# Patient Record
Sex: MALE | Employment: FULL TIME | URBAN - METROPOLITAN AREA
[De-identification: names, ages, dates, MRNs, and addresses within clinical notes are randomized per-mention and may not be internally consistent; named-entity substitution may affect disease eponyms.]

---

## 2018-05-15 ENCOUNTER — OFFICE VISIT (OUTPATIENT)
Dept: INTERNAL MEDICINE CLINIC | Facility: CLINIC | Age: 23
End: 2018-05-15

## 2018-05-15 VITALS
HEIGHT: 69 IN | OXYGEN SATURATION: 98 % | SYSTOLIC BLOOD PRESSURE: 106 MMHG | TEMPERATURE: 99 F | WEIGHT: 198 LBS | DIASTOLIC BLOOD PRESSURE: 76 MMHG | BODY MASS INDEX: 29.33 KG/M2 | HEART RATE: 64 BPM | RESPIRATION RATE: 12 BRPM

## 2018-05-15 DIAGNOSIS — Z00.00 ROUTINE GENERAL MEDICAL EXAMINATION AT A HEALTH CARE FACILITY: Primary | ICD-10-CM

## 2018-05-15 PROBLEM — J45.20 MILD INTERMITTENT ASTHMA WITHOUT COMPLICATION: Status: ACTIVE | Noted: 2018-05-15

## 2018-05-15 PROBLEM — J45.20 MILD INTERMITTENT ASTHMA WITHOUT COMPLICATION (HCC): Status: ACTIVE | Noted: 2018-05-15

## 2018-05-15 PROCEDURE — 99385 PREV VISIT NEW AGE 18-39: CPT | Performed by: NURSE PRACTITIONER

## 2018-05-15 RX ORDER — ALBUTEROL SULFATE 90 UG/1
2 AEROSOL, METERED RESPIRATORY (INHALATION) EVERY 6 HOURS PRN
COMMUNITY
End: 2018-08-14

## 2018-05-15 NOTE — PROGRESS NOTES
Dimitry Singh is a 20year old male who presents for a complete physical exam.   HPI:   Pt complains of chest enlargement.  He is unsure if his chest is larger than a typical male chest. It has been this way as long as he can remember and would like it breath with exertion  CARDIOVASCULAR: denies chest pain on exertion  GI: denies abdominal pain except after eating spicy foods,denies heartburn  : denies nocturia or changes in stream  MUSCULOSKELETAL: denies back pain  NEURO: occasional headaches when h sleep. Health maintenance, discussed no need for labs until age 21 with no major concerns or family history. Discussed testicular self exam with patient who verbalizes understanding.   The patient indicates understanding of these issues and agrees to the pl

## 2018-08-14 ENCOUNTER — OFFICE VISIT (OUTPATIENT)
Dept: INTERNAL MEDICINE CLINIC | Facility: CLINIC | Age: 23
End: 2018-08-14
Payer: COMMERCIAL

## 2018-08-14 VITALS
OXYGEN SATURATION: 96 % | SYSTOLIC BLOOD PRESSURE: 102 MMHG | HEIGHT: 69.5 IN | WEIGHT: 203.5 LBS | DIASTOLIC BLOOD PRESSURE: 66 MMHG | BODY MASS INDEX: 29.46 KG/M2 | HEART RATE: 58 BPM | TEMPERATURE: 98 F | RESPIRATION RATE: 18 BRPM

## 2018-08-14 DIAGNOSIS — R51.9 GENERALIZED HEADACHES: ICD-10-CM

## 2018-08-14 DIAGNOSIS — S13.4XXA WHIPLASH INJURY TO NECK, INITIAL ENCOUNTER: ICD-10-CM

## 2018-08-14 DIAGNOSIS — M54.41 ACUTE RIGHT-SIDED LOW BACK PAIN WITH RIGHT-SIDED SCIATICA: ICD-10-CM

## 2018-08-14 DIAGNOSIS — M25.561 ACUTE PAIN OF RIGHT KNEE: ICD-10-CM

## 2018-08-14 DIAGNOSIS — R93.0 ABNORMAL CT SCAN OF HEAD: Primary | ICD-10-CM

## 2018-08-14 PROCEDURE — 99214 OFFICE O/P EST MOD 30 MIN: CPT | Performed by: INTERNAL MEDICINE
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
## 2018-08-14 NOTE — PATIENT INSTRUCTIONS
- Start anti-inflammatory (like ibuprofen)  - Follow up with physical therapy Tata Lazaro or MARTINEI)  - Follow up with Neurology (Dr. Elena Santiago). She is here on Thursdays. In [place] rest of the week.    - Depending on how physical therapy

## 2020-11-07 NOTE — PATIENT INSTRUCTIONS
Treating Anxiety Disorders with Therapy    If you have an anxiety disorder, you don’t have to suffer needlessly. Treatment is available. Therapy (also called counseling) is often a helpful treatment for anxiety disorders.  With therapy, a trained morro treatment only works if you learn to face the causes of your anxiety. So, you might feel worse before you feel better. This can sometimes make it hard to stick with it. But remember: Therapy is a very effective treatment. The results will be well worth it. natural defense system. It's an alert to a threat that is unknown, vague, or comes from your own internal fears. While you’re in this state, your feelings can range from a vague sense of worry to physical sensations such as a pounding heartbeat.  These feel anxiety that’s disrupting your life can be treated. Check with your healthcare provider and rule out any physical problems that may be causing the anxiety symptoms. If an anxiety disorder is diagnosed, seek mental healthcare.  This is an illness and it can to stress and normally causes only a mild reaction. When anxiety becomes more severe, it can interfere with daily life. In some cases, you may not even be aware of what you’re anxious about. There may also be a genetic link.  Or it may be a learned behavior techniques that are helpful:  ? Relaxation  ? Breathing exercises  ? Visualization  ? Biofeedback  ? Meditation  For more information about this, talk with your healthcare provider. Or check online or at your [employer] or bookstore.  You'll find many jimbo

## 2020-11-07 NOTE — PROGRESS NOTES
Patient Office Visit    ASSESSMENT AND PLAN:   (F41.9) Anxiety  (primary encounter diagnosis)  Plan: [facility] - INTERNAL  Note: will request records from the ER. Reassurance provided and discussed about anxiety coping techniques.  Referral Career and having 3 7 month old   - he has not been going to the gym as well   - he drinks some pre work out drinks and caffeine,     Which he has since stopped   - he was taking CBD as well and stopped it about      A week ago prior to the symptoms   - h bleeding   Psychiatric/Behavioral: normal mood no anxiety normal behavior     /80 (BP Location: Left arm, Patient Position: Sitting, Cuff Size: adult)   Pulse 86   Temp 97.6 °F (36.4 °C) (Oral)   Resp 16   Ht 69.5\"   Wt 206 lb 6.4 oz (93.6 kg)   SpO

## 2020-11-09 ENCOUNTER — TELEPHONE (OUTPATIENT)
Dept: INTERNAL MEDICINE CLINIC | Facility: CLINIC | Age: 25
End: 2020-11-09

## 2021-04-12 ENCOUNTER — APPOINTMENT (OUTPATIENT)
Dept: GENERAL RADIOLOGY | Age: 26
End: 2021-04-12
Attending: EMERGENCY MEDICINE

## 2021-04-12 ENCOUNTER — HOSPITAL ENCOUNTER (EMERGENCY)
Age: 26
Discharge: LEFT BEFORE DISCHARGE | End: 2021-04-12

## 2021-04-12 VITALS
OXYGEN SATURATION: 99 % | TEMPERATURE: 97.5 F | RESPIRATION RATE: 20 BRPM | HEART RATE: 66 BPM | DIASTOLIC BLOOD PRESSURE: 98 MMHG | SYSTOLIC BLOOD PRESSURE: 158 MMHG | WEIGHT: 215.39 LBS

## 2021-04-12 LAB
ALBUMIN SERPL-MCNC: 4.5 G/DL (ref 3.6–5.1)
ALBUMIN/GLOB SERPL: 1.5 {RATIO} (ref 1–2.4)
ALP SERPL-CCNC: 84 UNITS/L (ref 45–117)
ALT SERPL-CCNC: 86 UNITS/L
ANION GAP SERPL CALC-SCNC: 10 MMOL/L (ref 10–20)
AST SERPL-CCNC: 27 UNITS/L
BASOPHILS # BLD: 0.1 K/MCL (ref 0–0.3)
BASOPHILS NFR BLD: 1 %
BILIRUB SERPL-MCNC: 0.4 MG/DL (ref 0.2–1)
BUN SERPL-MCNC: 14 MG/DL (ref 6–20)
BUN/CREAT SERPL: 15 (ref 7–25)
CALCIUM SERPL-MCNC: 9.4 MG/DL (ref 8.4–10.2)
CHLORIDE SERPL-SCNC: 105 MMOL/L (ref 98–107)
CO2 SERPL-SCNC: 26 MMOL/L (ref 21–32)
CREAT SERPL-MCNC: 0.96 MG/DL (ref 0.67–1.17)
DEPRECATED RDW RBC: 37.5 FL (ref 39–50)
EOSINOPHIL # BLD: 0.1 K/MCL (ref 0–0.5)
EOSINOPHIL NFR BLD: 1 %
ERYTHROCYTE [DISTWIDTH] IN BLOOD: 11.8 % (ref 11–15)
FASTING DURATION TIME PATIENT: ABNORMAL H
GFR SERPLBLD BASED ON 1.73 SQ M-ARVRAT: >90 ML/MIN/1.73M2
GLOBULIN SER-MCNC: 3.1 G/DL (ref 2–4)
GLUCOSE SERPL-MCNC: 106 MG/DL (ref 65–99)
HCT VFR BLD CALC: 47.1 % (ref 39–51)
HGB BLD-MCNC: 16.3 G/DL (ref 13–17)
IMM GRANULOCYTES # BLD AUTO: 0 K/MCL (ref 0–0.2)
IMM GRANULOCYTES # BLD: 0 %
LYMPHOCYTES # BLD: 2.2 K/MCL (ref 1–4.8)
LYMPHOCYTES NFR BLD: 22 %
MCH RBC QN AUTO: 30.3 PG (ref 26–34)
MCHC RBC AUTO-ENTMCNC: 34.6 G/DL (ref 32–36.5)
MCV RBC AUTO: 87.5 FL (ref 78–100)
MONOCYTES # BLD: 0.8 K/MCL (ref 0.3–0.9)
MONOCYTES NFR BLD: 8 %
NEUTROPHILS # BLD: 6.8 K/MCL (ref 1.8–7.7)
NEUTROPHILS NFR BLD: 68 %
NRBC BLD MANUAL-RTO: 0 /100 WBC
PLATELET # BLD AUTO: 283 K/MCL (ref 140–450)
POTASSIUM SERPL-SCNC: 3.8 MMOL/L (ref 3.4–5.1)
PROT SERPL-MCNC: 7.6 G/DL (ref 6.4–8.2)
RBC # BLD: 5.38 MIL/MCL (ref 4.5–5.9)
SODIUM SERPL-SCNC: 137 MMOL/L (ref 135–145)
TROPONIN I SERPL HS-MCNC: <0.02 NG/ML
WBC # BLD: 9.9 K/MCL (ref 4.2–11)

## 2021-04-12 PROCEDURE — 93005 ELECTROCARDIOGRAM TRACING: CPT | Performed by: EMERGENCY MEDICINE

## 2021-04-12 PROCEDURE — 80053 COMPREHEN METABOLIC PANEL: CPT | Performed by: EMERGENCY MEDICINE

## 2021-04-12 PROCEDURE — 84484 ASSAY OF TROPONIN QUANT: CPT | Performed by: EMERGENCY MEDICINE

## 2021-04-12 PROCEDURE — 71046 X-RAY EXAM CHEST 2 VIEWS: CPT

## 2021-04-12 PROCEDURE — 85025 COMPLETE CBC W/AUTO DIFF WBC: CPT | Performed by: EMERGENCY MEDICINE

## 2021-04-12 PROCEDURE — 10003627 HB COUNTER ED NO SERVICE

## 2021-04-12 PROCEDURE — 36415 COLL VENOUS BLD VENIPUNCTURE: CPT

## 2021-04-12 ASSESSMENT — PAIN SCALES - GENERAL: PAINLEVEL_OUTOF10: 3

## 2021-04-13 LAB
ATRIAL RATE (BPM): 71
P AXIS (DEGREES): 55
PR-INTERVAL (MSEC): 134
QRS-INTERVAL (MSEC): 88
QT-INTERVAL (MSEC): 364
QTC: 395
R AXIS (DEGREES): 26
RAINBOW EXTRA TUBES HOLD SPECIMEN: NORMAL
REPORT TEXT: NORMAL
T AXIS (DEGREES): 33
VENTRICULAR RATE EKG/MIN (BPM): 71

## 2021-04-15 ENCOUNTER — TELEPHONE (OUTPATIENT)
Dept: INTERNAL MEDICINE CLINIC | Facility: CLINIC | Age: 26
End: 2021-04-15

## 2021-04-15 NOTE — TELEPHONE ENCOUNTER
Patient requesting to be seen by Dr. Barry Ramirez because his parents are seen by him. Ok to schedule?

## 2021-04-16 ENCOUNTER — OFFICE VISIT (OUTPATIENT)
Dept: INTERNAL MEDICINE CLINIC | Facility: CLINIC | Age: 26
End: 2021-04-16
Payer: COMMERCIAL

## 2021-04-16 VITALS
DIASTOLIC BLOOD PRESSURE: 88 MMHG | OXYGEN SATURATION: 99 % | TEMPERATURE: 99 F | HEART RATE: 76 BPM | RESPIRATION RATE: 16 BRPM | WEIGHT: 205.19 LBS | BODY MASS INDEX: 29.71 KG/M2 | HEIGHT: 69.75 IN | SYSTOLIC BLOOD PRESSURE: 124 MMHG

## 2021-04-16 DIAGNOSIS — F41.9 ANXIETY: ICD-10-CM

## 2021-04-16 DIAGNOSIS — R07.9 CHEST PAIN, UNSPECIFIED TYPE: Primary | ICD-10-CM

## 2021-04-16 PROCEDURE — 99214 OFFICE O/P EST MOD 30 MIN: CPT | Performed by: FAMILY MEDICINE

## 2021-04-16 PROCEDURE — 3074F SYST BP LT 130 MM HG: CPT | Performed by: FAMILY MEDICINE

## 2021-04-16 PROCEDURE — 3079F DIAST BP 80-89 MM HG: CPT | Performed by: FAMILY MEDICINE

## 2021-04-16 PROCEDURE — 3008F BODY MASS INDEX DOCD: CPT | Performed by: FAMILY MEDICINE

## 2021-04-16 NOTE — PROGRESS NOTES
Laury Del Real is a 31year old male.   HPI:   New pt to me   A yr ago [facility] twins [facility]   Wife and his mother had a disagreement about that time    At the time had a panic attack w hyperventilate and increased HR   Was cked and DC

## 2021-04-19 ENCOUNTER — TELEPHONE (OUTPATIENT)
Dept: INTERNAL MEDICINE CLINIC | Facility: CLINIC | Age: 26
End: 2021-04-19

## 2021-04-19 NOTE — TELEPHONE ENCOUNTER
Romero Chávez, [address] ,[address]  [address]  [place] 939 [phone]     Spoke with patient stating he feels like he is over thinking does not think he needs to go to ER for this.  Patient was previously referred to [facility]

## 2021-04-19 NOTE — TELEPHONE ENCOUNTER
If he truly has these negative thoughts should go to the ER to be eval by [facility]

## 2021-04-19 NOTE — TELEPHONE ENCOUNTER
Spoke with patient stating he took Lexapro on Friday, Saturday, sunday as prescribed by psych and he is having Bad thoughts-worse depression, worse anxiety, unsure if self harm thoughts, back pain, extreme anger. Patient does not want to f/u with psych.  Pt

## 2021-04-19 NOTE — TELEPHONE ENCOUNTER
Patient called and stated his psychiatrist prescribed him Lexapro and he called Dr. Nia Arias to let him know he would be taking that instead of the sertraline.    Patient just called and stated the medication has caused him extreme anger, extremely bad thought

## 2021-05-29 ENCOUNTER — LAB ENCOUNTER (OUTPATIENT)
Dept: LAB | Age: 26
End: 2021-05-29
Attending: FAMILY MEDICINE
Payer: COMMERCIAL

## 2021-05-29 ENCOUNTER — OFFICE VISIT (OUTPATIENT)
Dept: INTERNAL MEDICINE CLINIC | Facility: CLINIC | Age: 26
End: 2021-05-29
Payer: COMMERCIAL

## 2021-05-29 VITALS
BODY MASS INDEX: 27.94 KG/M2 | WEIGHT: 193 LBS | RESPIRATION RATE: 16 BRPM | HEIGHT: 69.75 IN | OXYGEN SATURATION: 97 % | TEMPERATURE: 99 F | HEART RATE: 74 BPM | SYSTOLIC BLOOD PRESSURE: 120 MMHG | DIASTOLIC BLOOD PRESSURE: 80 MMHG

## 2021-05-29 DIAGNOSIS — Z00.00 ROUTINE GENERAL MEDICAL EXAMINATION AT A HEALTH CARE FACILITY: ICD-10-CM

## 2021-05-29 DIAGNOSIS — R00.2 HEART PALPITATIONS: ICD-10-CM

## 2021-05-29 DIAGNOSIS — F41.9 ANXIETY: ICD-10-CM

## 2021-05-29 DIAGNOSIS — R07.89 FEELING OF CHEST TIGHTNESS: ICD-10-CM

## 2021-05-29 DIAGNOSIS — Z00.00 ROUTINE GENERAL MEDICAL EXAMINATION AT A HEALTH CARE FACILITY: Primary | ICD-10-CM

## 2021-05-29 PROCEDURE — 3079F DIAST BP 80-89 MM HG: CPT | Performed by: FAMILY MEDICINE

## 2021-05-29 PROCEDURE — 36415 COLL VENOUS BLD VENIPUNCTURE: CPT

## 2021-05-29 PROCEDURE — 84443 ASSAY THYROID STIM HORMONE: CPT

## 2021-05-29 PROCEDURE — 85025 COMPLETE CBC W/AUTO DIFF WBC: CPT

## 2021-05-29 PROCEDURE — 80061 LIPID PANEL: CPT

## 2021-05-29 PROCEDURE — 84403 ASSAY OF TOTAL TESTOSTERONE: CPT

## 2021-05-29 PROCEDURE — 84402 ASSAY OF FREE TESTOSTERONE: CPT

## 2021-05-29 PROCEDURE — 99395 PREV VISIT EST AGE 18-39: CPT | Performed by: FAMILY MEDICINE

## 2021-05-29 PROCEDURE — 3074F SYST BP LT 130 MM HG: CPT | Performed by: FAMILY MEDICINE

## 2021-05-29 PROCEDURE — 3008F BODY MASS INDEX DOCD: CPT | Performed by: FAMILY MEDICINE

## 2021-05-29 PROCEDURE — 99213 OFFICE O/P EST LOW 20 MIN: CPT | Performed by: FAMILY MEDICINE

## 2021-05-29 PROCEDURE — 80053 COMPREHEN METABOLIC PANEL: CPT

## 2021-05-29 PROCEDURE — 83036 HEMOGLOBIN GLYCOSYLATED A1C: CPT

## 2021-05-29 PROCEDURE — 82306 VITAMIN D 25 HYDROXY: CPT

## 2021-05-29 NOTE — PROGRESS NOTES
Fortunato Khan is a 31year old male who presents for a complete physical exam.   HPI:   Pt complains of loosing weight. Patient's weight is 12 pounds down from his April visit Pt was 205 and is now 193. Current BMI is 27.88.   Pt states saw Dr. Ani Nieto 4 Refused                          11/07/2020    Wt Readings from Last 6 Encounters:  05/29/21 : 193 lb (87.5 kg)  04/16/21 : 205 lb 3.2 oz (93.1 kg)  11/07/20 : 206 lb 6.4 oz (93.6 kg)  08/14/18 : 203 lb 8 oz (92.3 kg)  05/15/18 : 198 lb (89.8 kg)  06/25/14 stream  MUSCULOSKELETAL: denies back pain  NEURO: denies headaches  PSYCHE: denies depression or anxiety  HEMATOLOGIC: denies hx of anemia  ENDOCRINE: denies thyroid history  ALL/ASTHMA: denies hx of allergy or asthma    EXAM:   /80 (BP Location: Rig (CPT=93663); Future  - CARD MONITOR HOLTER 24 HOUR (CPT=93225); Future  - TESTOSTERONE,TOTAL AND FREE MALE; Future    2. Feeling of chest tightness    - CARD ECHO 2D DOPPLER (CPT=93647); Future  - CARD MONITOR HOLTER 24 HOUR (CPT=93705); Future    3.  Donnelly Dec

## 2021-05-31 ENCOUNTER — PATIENT MESSAGE (OUTPATIENT)
Dept: INTERNAL MEDICINE CLINIC | Facility: CLINIC | Age: 26
End: 2021-05-31

## 2021-05-31 DIAGNOSIS — E55.9 VITAMIN D DEFICIENCY: Primary | ICD-10-CM

## 2021-06-01 NOTE — TELEPHONE ENCOUNTER
From: Apoorva Cevallos  To: ARCHANA Panchal  Sent: 5/31/2021 12:27 PM CDT  Subject: Non-Urgent Medical Question    Kailash Winslow,    Is there anything abnormal about my blood test? Anything that would pertain to feeling anxious and nervous?

## 2021-06-01 NOTE — TELEPHONE ENCOUNTER
Luisa Curtis, APRN   5/30/2021 10:29 AM CDT Back to Top      Patient has Vit D deficiency. Pt needs vit D 71069 IU once a week for 6 months, after 2000 IU daily!!!. Recheck vit D in 6 months. Hdl low, increasing exercise would help that.  Ldl and non hdl

## 2021-06-02 ENCOUNTER — TELEPHONE (OUTPATIENT)
Dept: INTERNAL MEDICINE CLINIC | Facility: CLINIC | Age: 26
End: 2021-06-02

## 2021-06-02 RX ORDER — ERGOCALCIFEROL 1.25 MG/1
50000 CAPSULE ORAL WEEKLY
Qty: 4 CAPSULE | Refills: 5 | Status: SHIPPED | OUTPATIENT
Start: 2021-06-02 | End: 2021-11-17

## 2021-06-02 NOTE — TELEPHONE ENCOUNTER
Pt filled out medical release for all immunizations. Faxed over and placed in [address] file on desk.

## 2021-06-07 NOTE — TELEPHONE ENCOUNTER
Vit D deficiency can lead to reduced production of testosterone. Low Vit D levels are associated with anxiety.  It can take 6-8 weeks for vit D levels to get back up to normal. Pt could try seeing a urologist thru [address]

## 2021-06-07 NOTE — TELEPHONE ENCOUNTER
Spoke with pt asking if his low testosterone level can be related to vitamin D deficiency as well as his anxiety. Please advise.

## 2021-06-07 NOTE — TELEPHONE ENCOUNTER
Asya Gutierrez, APRN   6/6/2021  8:21 PM CDT Back to Top      Free testosterone low. Pt needs to f/u with urology Dr. Dawit Patiño [phone].

## 2021-06-14 PROBLEM — J45.909 STEROID-DEPENDENT ASTHMA: Status: ACTIVE | Noted: 2021-06-14

## 2021-06-14 PROBLEM — J45.909: Status: ACTIVE | Noted: 2021-06-14

## 2021-06-27 DIAGNOSIS — E55.9 VITAMIN D DEFICIENCY: ICD-10-CM

## 2021-06-28 RX ORDER — ERGOCALCIFEROL 1.25 MG/1
50000 CAPSULE ORAL WEEKLY
Qty: 4 CAPSULE | Refills: 5 | OUTPATIENT
Start: 2021-06-28 | End: 2021-12-13

## 2021-06-28 NOTE — TELEPHONE ENCOUNTER
Ergocalciferol 50,000 Units  Filled 6-2-21  Qty 4  5 refills    Pt to call pharmacy for them to refill

## 2021-07-03 ENCOUNTER — LAB ENCOUNTER (OUTPATIENT)
Dept: LAB | Age: 26
End: 2021-07-03
Attending: FAMILY MEDICINE
Payer: COMMERCIAL

## 2021-07-03 DIAGNOSIS — E29.1 HYPOGONADISM IN MALE: ICD-10-CM

## 2021-07-03 DIAGNOSIS — E55.9 VITAMIN D DEFICIENCY: ICD-10-CM

## 2021-07-03 LAB
TESTOST SERPL-MCNC: 256.16 NG/DL
VIT D+METAB SERPL-MCNC: 66.7 NG/ML (ref 30–100)

## 2021-07-03 PROCEDURE — 82306 VITAMIN D 25 HYDROXY: CPT | Performed by: FAMILY MEDICINE

## 2021-07-27 NOTE — PROGRESS NOTES
Mr. Mcgregor Curet-      Your repeat T level is slightly low, but not dangerously so. I would not recommend testosterone supplementation at your age, as it often becomes a life long medication.       Jakob Barnes

## 2021-09-13 NOTE — PROGRESS NOTES
Dorene Mack is a 31year old male.   HPI:   Patient has agreed to do a video encounter w me today in lieu of a regular appointment in regards to his meds     (see previous vist w me ) he was started on lexapro by psych back then but had SE ren palpitat

## 2021-11-08 ENCOUNTER — PATIENT MESSAGE (OUTPATIENT)
Dept: INTERNAL MEDICINE CLINIC | Facility: CLINIC | Age: 26
End: 2021-11-08

## 2021-11-08 RX ORDER — SERTRALINE HYDROCHLORIDE 25 MG/1
25 TABLET, FILM COATED ORAL EVERY MORNING
COMMUNITY
Start: 2021-08-24

## 2021-11-08 NOTE — TELEPHONE ENCOUNTER
From: Otf Lazcano  To: Collin Mena MD  Sent: 11/8/2021 7:57 AM CST  Subject: Inhaler refills     Jenna Culver,    I have 2 inhalers I have used for quite a while that are both empty.  Can you please put in a refill for the Albuterol as well as th

## 2021-11-10 NOTE — TELEPHONE ENCOUNTER
Pt also requested Symbicort but I do not see it on med list (Current or discontinued)    Albuterol Sulfate 108 (90 Base) MCG/ACT Inhalation Aerosol Powder, Breath Activated                Sig: Inhale 2 puffs into the lungs every 6 (six) hours as needed.

## 2022-08-24 NOTE — TELEPHONE ENCOUNTER
[facility] sent to patient to schedule OV due to not being in office for over 1 year    Sent to TO

## 2022-08-29 RX ORDER — ALBUTEROL SULFATE 90 UG/1
AEROSOL, METERED RESPIRATORY (INHALATION)
Qty: 8.5 G | Refills: 0 | OUTPATIENT
Start: 2022-08-29

## 2022-08-29 NOTE — TELEPHONE ENCOUNTER
Duplicate request. [facility] sent to pt stating this was a courtesy refill and will need to be seen for further refills.      Albuterol 108  Asthma & COPD Medication Protocol Failed 08/28/2022 10:35 AM    Asthma Action Score greater than or equal to 20    Appointment in past 6 or next 3 months     AAP/ACT given in last 12 months   Filled 8/24/22  Qty 1   0 refills  LOV 5-29-21

## 2023-11-13 RX ORDER — ALBUTEROL SULFATE 90 UG/1
2 AEROSOL, METERED RESPIRATORY (INHALATION) EVERY 6 HOURS PRN
Qty: 1 EACH | Refills: 0 | Status: SHIPPED | OUTPATIENT
Start: 2023-11-13

## 2023-11-13 NOTE — TELEPHONE ENCOUNTER
Pt states tat Albuterol Pro-Air Respiclick is not covered under insurance and would like a different inhaler

## 2023-12-04 RX ORDER — ALBUTEROL SULFATE 90 UG/1
2 AEROSOL, METERED RESPIRATORY (INHALATION) EVERY 6 HOURS PRN
Qty: 8.5 EACH | Refills: 0 | OUTPATIENT
Start: 2023-12-04

## 2023-12-04 NOTE — TELEPHONE ENCOUNTER
Requesting   Name from pharmacy: ALBUTEROL HFA (Renee Menon) Mickey Ruiz          Will file in chart as: ALBUTEROL 108 (90 Base) MCG/ACT Inhalation Aero Soln    Sig: INHALE 2 PUFFS INTO THE LUNGS EVERY 6 HOURS AS NEEDED FOR WHEEZE    Disp: 8.5 each    Refills: 0 (Pharmacy requested: Not specified)    Start: 12/4/2023    Class: Normal    Non-formulary    Last ordered: 3 weeks ago (11/13/2023) by Bryce Jason MD    Last refill: 11/13/2023    Rx #: [id number]    Asthma & COPD Medication Protocol Nfxczb2312/04/2023 12:09 AM    Asthma Action Score greater than or equal to 20    Appointment in past 6 or next 3 months    AAP/ACT given in last 12 months        LOV: 5/29/2021  RTC: 1 year   Last Relevant Labs: n/a   Filled: 11/13/2023 #1 with 0 refills    No future appointments.

## 2023-12-05 ENCOUNTER — TELEMEDICINE (OUTPATIENT)
Dept: INTERNAL MEDICINE CLINIC | Facility: CLINIC | Age: 28
End: 2023-12-05
Payer: COMMERCIAL

## 2023-12-05 DIAGNOSIS — J01.90 ACUTE NON-RECURRENT SINUSITIS, UNSPECIFIED LOCATION: Primary | ICD-10-CM

## 2023-12-05 RX ORDER — AMOXICILLIN AND CLAVULANATE POTASSIUM 875; 125 MG/1; MG/1
1 TABLET, FILM COATED ORAL 2 TIMES DAILY
Qty: 20 TABLET | Refills: 0 | Status: SHIPPED | OUTPATIENT
Start: 2023-12-05 | End: 2023-12-15

## 2023-12-05 RX ORDER — AMOXICILLIN AND CLAVULANATE POTASSIUM 875; 125 MG/1; MG/1
1 TABLET, FILM COATED ORAL 2 TIMES DAILY
Qty: 20 TABLET | Refills: 0 | Status: SHIPPED | OUTPATIENT
Start: 2023-12-05 | End: 2023-12-05 | Stop reason: CLARIF

## 2023-12-05 NOTE — PROGRESS NOTES
Virtual Video Check-In     This visit is conducted using Telemedicine with live, interactive video and audio. Joel Lennon, who has verified his/her identification by name and , verbally consents to a Virtual/Telephone Check-In visit on 23. Patient confirms that he/she is in the state of [place] at the time of service. Patient understands and accepts financial responsibility for any deductible, co-insurance and/or co-pays associated with this service. TIME: 8 minutes      HPI: The patient wishes to address some  upper respiratory symptoms. Has had them for 4  days. Patient reports congestion, dry cough, sinus pain, occ dizzy, chills, possible fever-felt hot, headaches,ears sensitive,occ wheezing. Pt was positive for covid 23. Pt states this does not feel like that . Pt feels he has a sinus infection. Pt has been using otc meds with little relief and has been using his albuterol inhaler and that has helped  his wheezing when it occurs. ROS:  GENERAL: possible fever, +chills,+ fatigue  SKIN: Denies rashes, skin lesions  EYES: Denies blurred vision or double vision  HENT: see HPI  CHEST: Denies chest pain, or palpitations  LUNGS: see HPI  GI: Denies abdominal pain, N/V/C/D.   MUSCULOSKELETAL: Denies any arthralgia,myalgias or swollen joints  LYMPH:  Denies lymphadenopathy  NEURO:  + headaches and occ lightheadedness. PSYCH: denies depression or anxiety    ALLERGIES:  Allergies   Allergen Reactions    Fish-Derived Products RASH    Shellfish RASH       Current Outpatient Medications   Medication Sig Dispense Refill    Albuterol Sulfate 108 (90 Base) MCG/ACT Inhalation Aerosol Powder, Breath Activated Inhale 2 puffs into the lungs every 6 (six) hours as needed. 1 each 0    albuterol 108 (90 Base) MCG/ACT Inhalation Aero Soln Inhale 2 puffs into the lungs every 6 (six) hours as needed for Wheezing.  1 each 0    Budesonide-Formoterol Fumarate 80-4.5 MCG/ACT Inhalation Aerosol Inhale 2 puffs into the lungs 2 (two) times daily. APPT DUE! 1 each 0       No past medical history on file. No past surgical history on file. Social History     Socioeconomic History    Marital status:    Tobacco Use    Smoking status: Heavy Smoker     Types: Cigarettes     Start date: 1/1/2010    Smokeless tobacco: Never    Tobacco comments:     Uses Hookah daily   Vaping Use    Vaping Use: Never used   Substance and Sexual Activity    Alcohol use: No    Drug use: No   Other Topics Concern    Caffeine Concern Yes     Comment: 1 large cup of coffee daily. Exercise Yes     Comment: Daily       Family History   Problem Relation Age of Onset    Hypertension Father     Diabetes Mother     Obesity Brother     Cancer Neg     Heart Disease Neg     Stroke Neg         PE:  No vital signs were taken for this video visit. GENERAL: well developed, well nourished, in no acute distress  SKIN: no rashes on visible parts of skin  HEENT: normocephalic, atraumatic, conjunctiva clear. Pt has audible congestion when he speaks  LUNGS: regular breathing rate and effort with no audible respiratory distress. Occ hacky cough noted. NEURO: A&Ox3  PSYCH: pleasant mood and affect, appropriate judgement and insight    ASSESSMENT/PLAN:    Encounter Diagnosis   Name Primary? Acute non-recurrent sinusitis, unspecified location Yes       No orders of the defined types were placed in this encounter. Meds & Refills for this Visit:  Requested Prescriptions     Signed Prescriptions Disp Refills    amoxicillin clavulanate 875-125 MG Oral Tab 20 tablet 0     Sig: Take 1 tablet by mouth 2 (two) times daily for 10 days.        Imaging & Consults:  None      -Cool Humidified air in room  - start Augmentin, take with food.  -Sleep with head elevated at nightime if coughing    -Push fluids, tea with honey and plenty of rest   -Limit dairy, to avoid increased congestion  -OTC cough medicine such as Robitussin DM   -OTC Tylenol/Ibuprofen -Soothing cough drops   -Flonase OTC 2 sprays each nostril daily  -Sudafed as needed for nasal congestion or an antihistamine like Allegra for post nasal drip. Call if symptoms worsen or do not improve. Patient verbalizes understanding of the treatment plan. *Please note that the following visit was completed using two-way, real-time interactive audio and/or video communication. This has been done in good jessica to provide continuity of care in the best interest of the provider-patient relationship, due to the ongoing public health crisis/national emergency and because of restrictions of visitation. There are limitations of this visit as physical exam could not be fully performed. Every conscious effort was taken to allow for sufficient and adequate time. Included in this visit, time may have been spent reviewing labs, medications, radiology tests and decision-making. Appropriate medical decision-making and tests are ordered as detailed in the plan of care above. Coding/billing information is submitted for this visit based on complexity of care and/or time spent for the visit.       Start Time: 2:30 pm  End Time: 2:38 pm    Zelpha Day APRN

## 2024-01-03 NOTE — TELEPHONE ENCOUNTER
Requesting    albuterol 108 (90 Base) MCG/ACT Inhalation Aero Soln         Sig: Inhale 2 puffs into the lungs every 6 (six) hours as needed for Wheezing.    Disp: 1 each    Refills: 0    Start: 1/2/2024    Class: Normal    Non-formulary    Last ordered: 1 month ago (11/13/2023) by Ze Madrid MD    Asthma & COPD Medication Protocol Poghzi7201/02/2024 11:13 PM    Asthma Action Score greater than or equal to 20    Appointment in past 6 or next 3 months    AAP/ACT given in last 12 months        LOV: 12/5/2023 TeleMed    RTC: none noted   Last Relevant Labs: n/a   Filled: 11/13/2023 #1 with 0 refills    No future appointments.

## 2024-01-05 RX ORDER — ALBUTEROL SULFATE 90 UG/1
2 AEROSOL, METERED RESPIRATORY (INHALATION) EVERY 6 HOURS PRN
Qty: 1 EACH | Refills: 0 | OUTPATIENT
Start: 2024-01-05

## 2024-02-06 RX ORDER — ALBUTEROL SULFATE 90 UG/1
2 AEROSOL, METERED RESPIRATORY (INHALATION) EVERY 6 HOURS PRN
Qty: 1 EACH | Refills: 0 | OUTPATIENT
Start: 2024-02-06

## 2024-02-06 NOTE — TELEPHONE ENCOUNTER
Pt is over due for CPX, has not been seen in office since 2021.    Albuterol 108    Asthma & COPD Medication Protocol Ayjnqy9902/06/2024 12:36 PM   Protocol Details Asthma Action Score greater than or equal to 20    AAP/ACT given in last 12 months      Filled 11-13-23  Qty 1  0 refills  No future appointments.  LOV 5-29-21 SM

## 2024-02-07 ENCOUNTER — PATIENT MESSAGE (OUTPATIENT)
Dept: INTERNAL MEDICINE CLINIC | Facility: CLINIC | Age: 29
End: 2024-02-07

## 2024-02-08 NOTE — TELEPHONE ENCOUNTER
Dr. Madrid refilled it yesterday for him. But if he wants further refills then Pt will need to come in per Dr. Madrid. Pt is way over due for an appt, Px, etc.

## 2024-02-08 NOTE — TELEPHONE ENCOUNTER
From: Jensen Cordero  To: Ayana Acharya  Sent: 2/7/2024 7:39 PM CST  Subject: Inhaler    Nayla,    I have requested the albuterol aerosol I have been taking for 20 years and it keeps getting denied by the admin. I am almost on empty. I don’t see a need to make an appointment for a medication I have always used. Can you please refill my inhaler?

## 2024-02-09 RX ORDER — ALBUTEROL SULFATE 90 UG/1
2 AEROSOL, METERED RESPIRATORY (INHALATION) EVERY 6 HOURS PRN
Qty: 1 EACH | Refills: 0 | Status: SHIPPED | OUTPATIENT
Start: 2024-02-09

## 2024-03-08 RX ORDER — ALBUTEROL SULFATE 90 UG/1
2 AEROSOL, METERED RESPIRATORY (INHALATION) EVERY 6 HOURS PRN
Qty: 8.5 EACH | Refills: 0 | OUTPATIENT
Start: 2024-03-08

## 2024-04-29 ENCOUNTER — OFFICE VISIT (OUTPATIENT)
Dept: INTERNAL MEDICINE CLINIC | Facility: CLINIC | Age: 29
End: 2024-04-29
Payer: COMMERCIAL

## 2024-04-29 VITALS
DIASTOLIC BLOOD PRESSURE: 80 MMHG | HEIGHT: 69.5 IN | BODY MASS INDEX: 33.07 KG/M2 | HEART RATE: 84 BPM | TEMPERATURE: 98 F | WEIGHT: 228.38 LBS | OXYGEN SATURATION: 97 % | SYSTOLIC BLOOD PRESSURE: 112 MMHG

## 2024-04-29 DIAGNOSIS — Z00.00 LABORATORY EXAM ORDERED AS PART OF ROUTINE GENERAL MEDICAL EXAMINATION: ICD-10-CM

## 2024-04-29 DIAGNOSIS — Z91.018 FOOD ALLERGY: ICD-10-CM

## 2024-04-29 DIAGNOSIS — Z00.00 WELLNESS EXAMINATION: Primary | ICD-10-CM

## 2024-04-29 RX ORDER — OMEPRAZOLE 20 MG/1
20 CAPSULE, DELAYED RELEASE ORAL
COMMUNITY
Start: 2022-02-23 | End: 2024-04-29

## 2024-04-29 RX ORDER — FLUTICASONE FUROATE AND VILANTEROL 200; 25 UG/1; UG/1
1 POWDER RESPIRATORY (INHALATION) DAILY
Qty: 1 EACH | Refills: 1 | Status: SHIPPED | OUTPATIENT
Start: 2024-04-29

## 2024-04-29 NOTE — PROGRESS NOTES
Jensen Cordero is a 28 year old male who presents for a complete physical exam.   HPI:       Wt Readings from Last 6 Encounters:   04/29/24 228 lb 6.4 oz (103.6 kg)   05/29/21 193 lb (87.5 kg)   04/16/21 205 lb 3.2 oz (93.1 kg)   11/07/20 206 lb 6.4 oz (93.6 kg)   08/14/18 203 lb 8 oz (92.3 kg)   05/15/18 198 lb (89.8 kg)     Body mass index is 33.25 kg/m².     Cholesterol, Total (mg/dL)   Date Value   05/29/2021 182     HDL Cholesterol (mg/dL)   Date Value   05/29/2021 39 (L)     LDL Cholesterol (mg/dL)   Date Value   05/29/2021 120 (H)     AST (U/L)   Date Value   05/29/2021 22     ALT (U/L)   Date Value   05/29/2021 46      Current Outpatient Medications   Medication Sig Dispense Refill    albuterol 108 (90 Base) MCG/ACT Inhalation Aero Soln Inhale 2 puffs into the lungs every 6 (six) hours as needed for Wheezing. 1 each 0    Budesonide-Formoterol Fumarate 80-4.5 MCG/ACT Inhalation Aerosol Inhale 2 puffs into the lungs 2 (two) times daily. APPT DUE! 1 each 0    omeprazole 20 MG Oral Capsule Delayed Release Take 1 capsule (20 mg total) by mouth. (Patient not taking: Reported on 4/29/2024)      Albuterol Sulfate 108 (90 Base) MCG/ACT Inhalation Aerosol Powder, Breath Activated Inhale 2 puffs into the lungs every 6 (six) hours as needed. (Patient not taking: Reported on 4/29/2024) 1 each 0      No past medical history on file.   No past surgical history on file.   Family History   Problem Relation Age of Onset    Hypertension Father     Diabetes Mother     Obesity Brother     Cancer Neg     Heart Disease Neg     Stroke Neg       Social History:  Social History     Socioeconomic History    Marital status:    Tobacco Use    Smoking status: Heavy Smoker     Types: Cigarettes     Start date: 1/1/2010    Smokeless tobacco: Never    Tobacco comments:     Uses Hookah daily   Vaping Use    Vaping status: Never Used   Substance and Sexual Activity    Alcohol use: No    Drug use: No   Other Topics Concern     Caffeine Concern Yes     Comment: 1 large cup of coffee daily.     Exercise Yes     Comment: Daily      .        REVIEW OF SYSTEMS:   GENERAL: feels well otherwise  SKIN: denies rash  HEENT: denies nasal congestion, sinus pain or ST  LUNGS: denies shortness of breath  CARDIOVASCULAR: denies chest pain on exertion    exercises daily  lifting  cardio   boxing   GI: denies abdominal pain  : denies dysuria  NEURO: denies headaches  PSYCHE: h/o anxiety    was more so few yrs ago   was on meds    Did see provider in [place]   and dx w OCD      currently doing well    not on meds   applied tools he learned thru therapy and is doing well w it     exercise helps the stress    HEMATOLOGIC: denies hx of anemia  ENDOCRINE: denies thyroid history  ALL/ASTHMA:   asthma   uses inhaler 3-4x a day    smokes hookah daily    would like to get tested for allergies       EXAM:   /80 (BP Location: Left arm, Patient Position: Sitting, Cuff Size: large)   Pulse 84   Temp 98 °F (36.7 °C) (Temporal)   Ht 5' 9.5\" (1.765 m)   Wt 228 lb 6.4 oz (103.6 kg)   SpO2 97%   BMI 33.25 kg/m²   Body mass index is 33.25 kg/m².   GENERAL: well developed, well nourished,in no apparent distress  SKIN: no rashes,  HEENT: atraumatic, normocephalic,ears and throat are clear  NECK: supple,no adenopathy  LUNGS: clear to auscultation  CARDIO: RRR without murmur  GI: good BS's,no masses, HSM or tenderness  : two descended testes,no masses,no hernia,no penile lesions  EXTREMITIES: no edema  NEURO: motor and sensory are grossly intact    ASSESSMENT AND PLAN:   Jensen Cordero is a 28 year old male who presents for a complete physical exam.  Health maintenance, will check fasting Lipids, CMP, CBC and UA.  Discussed his stress and anxiety   Glad he feels better    Rec keep doing what he is doing   discussed vaccines    he will think about it    discussed smoking   He will cut down   discussed asthma    Will use breo 200 daily   Call w update in June       The patient indicates understanding of these issues and agrees to the plan.  The patient is asked to return for CPX in 1yr.

## 2024-04-30 PROBLEM — F42.9 OCD (OBSESSIVE COMPULSIVE DISORDER): Status: ACTIVE | Noted: 2024-04-30

## 2024-07-03 NOTE — TELEPHONE ENCOUNTER
See result note Quality 130: Documentation Of Current Medications In The Medical Record: Current Medications Documented Detail Level: Detailed Quality 431: Preventive Care And Screening: Unhealthy Alcohol Use - Screening: Patient not identified as an unhealthy alcohol user when screened for unhealthy alcohol use using a systematic screening method Quality 226: Preventive Care And Screening: Tobacco Use: Screening And Cessation Intervention: Patient screened for tobacco use and is an ex/non-smoker Quality 47: Advance Care Plan: Advance Care Planning discussed and documented; advance care plan or surrogate decision maker documented in the medical record.

## 2024-07-18 ENCOUNTER — LAB ENCOUNTER (OUTPATIENT)
Dept: LAB | Age: 29
End: 2024-07-18
Attending: FAMILY MEDICINE
Payer: COMMERCIAL

## 2024-07-18 ENCOUNTER — PATIENT MESSAGE (OUTPATIENT)
Dept: INTERNAL MEDICINE CLINIC | Facility: CLINIC | Age: 29
End: 2024-07-18

## 2024-07-18 DIAGNOSIS — Z00.00 WELLNESS EXAMINATION: ICD-10-CM

## 2024-07-18 DIAGNOSIS — E83.52 SERUM CALCIUM ELEVATED: Primary | ICD-10-CM

## 2024-07-18 DIAGNOSIS — Z00.00 LABORATORY EXAM ORDERED AS PART OF ROUTINE GENERAL MEDICAL EXAMINATION: ICD-10-CM

## 2024-07-18 DIAGNOSIS — Z91.018 FOOD ALLERGY: ICD-10-CM

## 2024-07-18 LAB
ALBUMIN SERPL-MCNC: 4.9 G/DL (ref 3.2–4.8)
ALBUMIN/GLOB SERPL: 1.6 {RATIO} (ref 1–2)
ALP LIVER SERPL-CCNC: 67 U/L
ALT SERPL-CCNC: 44 U/L
ANION GAP SERPL CALC-SCNC: 4 MMOL/L (ref 0–18)
AST SERPL-CCNC: 27 U/L (ref ?–34)
BASOPHILS # BLD AUTO: 0.09 X10(3) UL (ref 0–0.2)
BASOPHILS NFR BLD AUTO: 1.2 %
BILIRUB SERPL-MCNC: 0.6 MG/DL (ref 0.3–1.2)
BILIRUB UR QL: NEGATIVE
BUN BLD-MCNC: 12 MG/DL (ref 9–23)
BUN/CREAT SERPL: 10.1 (ref 10–20)
CALCIUM BLD-MCNC: 10.7 MG/DL (ref 8.7–10.4)
CHLORIDE SERPL-SCNC: 103 MMOL/L (ref 98–112)
CHOLEST SERPL-MCNC: 199 MG/DL (ref ?–200)
CLARITY UR: CLEAR
CO2 SERPL-SCNC: 30 MMOL/L (ref 21–32)
CREAT BLD-MCNC: 1.19 MG/DL
DEPRECATED RDW RBC AUTO: 40.7 FL (ref 35.1–46.3)
EGFRCR SERPLBLD CKD-EPI 2021: 85 ML/MIN/1.73M2 (ref 60–?)
EOSINOPHIL # BLD AUTO: 0.13 X10(3) UL (ref 0–0.7)
EOSINOPHIL NFR BLD AUTO: 1.8 %
ERYTHROCYTE [DISTWIDTH] IN BLOOD BY AUTOMATED COUNT: 12.4 % (ref 11–15)
FASTING PATIENT LIPID ANSWER: YES
FASTING STATUS PATIENT QL REPORTED: YES
GLOBULIN PLAS-MCNC: 3 G/DL (ref 2–3.5)
GLUCOSE BLD-MCNC: 90 MG/DL (ref 70–99)
GLUCOSE UR-MCNC: NORMAL MG/DL
HCT VFR BLD AUTO: 48.5 %
HDLC SERPL-MCNC: 46 MG/DL (ref 40–59)
HGB BLD-MCNC: 17.1 G/DL
HGB UR QL STRIP.AUTO: NEGATIVE
IMM GRANULOCYTES # BLD AUTO: 0.02 X10(3) UL (ref 0–1)
IMM GRANULOCYTES NFR BLD: 0.3 %
KETONES UR-MCNC: NEGATIVE MG/DL
LDLC SERPL CALC-MCNC: 129 MG/DL (ref ?–100)
LEUKOCYTE ESTERASE UR QL STRIP.AUTO: NEGATIVE
LYMPHOCYTES # BLD AUTO: 2.56 X10(3) UL (ref 1–4)
LYMPHOCYTES NFR BLD AUTO: 35.5 %
MCH RBC QN AUTO: 31.1 PG (ref 26–34)
MCHC RBC AUTO-ENTMCNC: 35.3 G/DL (ref 31–37)
MCV RBC AUTO: 88.3 FL
MONOCYTES # BLD AUTO: 0.6 X10(3) UL (ref 0.1–1)
MONOCYTES NFR BLD AUTO: 8.3 %
NEUTROPHILS # BLD AUTO: 3.81 X10 (3) UL (ref 1.5–7.7)
NEUTROPHILS # BLD AUTO: 3.81 X10(3) UL (ref 1.5–7.7)
NEUTROPHILS NFR BLD AUTO: 52.9 %
NITRITE UR QL STRIP.AUTO: NEGATIVE
NONHDLC SERPL-MCNC: 153 MG/DL (ref ?–130)
OSMOLALITY SERPL CALC.SUM OF ELEC: 283 MOSM/KG (ref 275–295)
PH UR: 7.5 [PH] (ref 5–8)
PLATELET # BLD AUTO: 271 10(3)UL (ref 150–450)
POTASSIUM SERPL-SCNC: 4.9 MMOL/L (ref 3.5–5.1)
PROT SERPL-MCNC: 7.9 G/DL (ref 5.7–8.2)
PROT UR-MCNC: NEGATIVE MG/DL
RBC # BLD AUTO: 5.49 X10(6)UL
SODIUM SERPL-SCNC: 137 MMOL/L (ref 136–145)
SP GR UR STRIP: 1.02 (ref 1–1.03)
TRIGL SERPL-MCNC: 135 MG/DL (ref 30–149)
UROBILINOGEN UR STRIP-ACNC: NORMAL
VLDLC SERPL CALC-MCNC: 24 MG/DL (ref 0–30)
WBC # BLD AUTO: 7.2 X10(3) UL (ref 4–11)

## 2024-07-18 PROCEDURE — 85025 COMPLETE CBC W/AUTO DIFF WBC: CPT | Performed by: FAMILY MEDICINE

## 2024-07-18 PROCEDURE — 80061 LIPID PANEL: CPT | Performed by: FAMILY MEDICINE

## 2024-07-18 PROCEDURE — 80053 COMPREHEN METABOLIC PANEL: CPT | Performed by: FAMILY MEDICINE

## 2024-07-18 PROCEDURE — 81003 URINALYSIS AUTO W/O SCOPE: CPT

## 2024-07-19 NOTE — TELEPHONE ENCOUNTER
From: Jensen Cordero  To: Ze Madrid  Sent: 7/18/2024 4:14 PM CDT  Subject: BLOOD TEST    Hi ,    Are there any concerns with my labwork?

## 2024-07-22 NOTE — TELEPHONE ENCOUNTER
Ze Madrid MD  7/19/2024  8:05 AM CDT Back to Top      Borderline cholesterol    Diet and weight loss can help      Mild increase calcium is nonspecific   rec denise Calcium in 6 mo  Otherwise OK w labs         TO: see mcm, pt would like to see nutritionist regarding cholesterol, do you feel this is necessary?

## 2024-09-09 RX ORDER — ALBUTEROL SULFATE 90 UG/1
2 AEROSOL, METERED RESPIRATORY (INHALATION) EVERY 6 HOURS PRN
Qty: 1 EACH | Refills: 0 | Status: SHIPPED | OUTPATIENT
Start: 2024-09-09

## 2024-09-09 NOTE — TELEPHONE ENCOUNTER
Patient comment: Requesting aerosol and not powder or respiclick     Albuterol 108    Asthma & COPD Medication Protocol Baewcy8609/08/2024 11:19 PM   Protocol Details Asthma Action Score greater than or equal to 20    AAP/ACT given in last 12 months      Filled 2-9-24  Qty 1  0 refills  No future appointments.  LOV 4-29-24 TO

## 2024-10-04 RX ORDER — ALBUTEROL SULFATE 90 UG/1
2 INHALANT RESPIRATORY (INHALATION) EVERY 6 HOURS PRN
Qty: 18 EACH | Refills: 0 | Status: SHIPPED | OUTPATIENT
Start: 2024-10-04

## 2024-10-04 NOTE — TELEPHONE ENCOUNTER
ALBUTEROL HFA (VENTOLIN) INH          Will file in chart as: ALBUTEROL 108 (90 Base) MCG/ACT Inhalation Aero Soln    Sig: INHALE 2 PUFFS INTO THE LUNGS EVERY 6 HOURS AS NEEDED FOR WHEEZE    Disp: 18 each    Refills: 0 (Pharmacy requested: Not specified)    Start: 10/4/2024    Class: Normal    Non-formulary    Last ordered: 3 weeks ago (9/9/2024) by Ze Madrid MD    Last refill: 9/9/2024    Rx #: [id number]    Asthma & COPD Medication Protocol Anbysj03/04/2024 12:08 AM   Protocol Details Asthma Action Score greater than or equal to 20    AAP/ACT given in last 12 months    Appointment in past 6 or next 3 months      LOV 4/29/24  RTC 1 year   Filled 9/9/24 1 each 0 refills   No future appointments.

## 2024-11-05 RX ORDER — ALBUTEROL SULFATE 90 UG/1
2 INHALANT RESPIRATORY (INHALATION) EVERY 6 HOURS PRN
Qty: 18 EACH | Refills: 0 | Status: SHIPPED | OUTPATIENT
Start: 2024-11-05

## 2024-11-05 NOTE — TELEPHONE ENCOUNTER
Albuterol 108     Asthma & COPD Medication Protocol Ksgqam1611/05/2024 09:09 AM   Protocol Details ACT Score greater than or equal to 20    Appointment in past 6 or next 3 months    ACT recorded in the last 12 months      Filled 10-4-24  Qty 18  0 refills  No future appointments.  LOV 4-29-24 TO

## 2024-12-08 RX ORDER — ALBUTEROL SULFATE 90 UG/1
2 INHALANT RESPIRATORY (INHALATION) EVERY 6 HOURS PRN
Qty: 18 EACH | Refills: 0 | Status: SHIPPED | OUTPATIENT
Start: 2024-12-08

## 2025-01-07 RX ORDER — ALBUTEROL SULFATE 90 UG/1
2 INHALANT RESPIRATORY (INHALATION) EVERY 6 HOURS PRN
Qty: 18 EACH | Refills: 0 | Status: SHIPPED | OUTPATIENT
Start: 2025-01-07

## 2025-01-07 NOTE — TELEPHONE ENCOUNTER
Albuterol 108 mcg/act  Asthma & COPD Medication Protocol Glsxpo2001/07/2025 12:05 AM   Protocol Details ACT Score greater than or equal to 20    Appointment in past 6 or next 3 months    ACT recorded in the last 12 months   Filled 12-8-24  Qty 18  0 refills  No future appointments.  LOV 4-29-24 TO

## (undated) NOTE — LETTER
ASTHMA ACTION PLAN for Jack Diasr     : 1995     Date: 5/15/2018  Provider:  NIDIA Miranda  Phone for doctor or clinic: [address], [address]  [address], [facility]  [place] [phone]

## (undated) NOTE — LETTER
06/28/21        [place] [address]      Dear Haider Nelson,    2020 [facility] records indicate that you have outstanding lab work and or testing that was ordered for you and has not yet been completed:   To schedule your test or la

## (undated) NOTE — LETTER
02/21/25        Jensen Cordero  [address] [place] [address]      Dear Jensen,    Our records indicate that you have outstanding lab work and or testing that was ordered for you and has not yet been completed:  Orders Placed This Encounter      Calcium [E]      To provide you with the best possible care, please complete these orders at your earliest convenience. If you have recently completed these orders please disregard this letter.     If you have any questions please call the office at Dept: [phone].     Thank you,       Ze Madrid MD